# Patient Record
Sex: MALE | Race: WHITE | Employment: OTHER | URBAN - METROPOLITAN AREA
[De-identification: names, ages, dates, MRNs, and addresses within clinical notes are randomized per-mention and may not be internally consistent; named-entity substitution may affect disease eponyms.]

---

## 2017-03-17 ENCOUNTER — OFFICE VISIT (OUTPATIENT)
Dept: OPHTHALMOLOGY | Facility: CLINIC | Age: 77
End: 2017-03-17

## 2017-03-17 DIAGNOSIS — H43.21 ASTEROID HYALITIS OF RIGHT EYE: ICD-10-CM

## 2017-03-17 DIAGNOSIS — H52.223 REGULAR ASTIGMATISM, BILATERAL: ICD-10-CM

## 2017-03-17 DIAGNOSIS — H25.13 NUCLEAR SCLEROTIC CATARACT OF BOTH EYES: Primary | ICD-10-CM

## 2017-03-17 RX ORDER — ESCITALOPRAM OXALATE 10 MG/1
10 TABLET ORAL DAILY
COMMUNITY

## 2017-03-17 ASSESSMENT — REFRACTION_MANIFEST
OD_CYLINDER: +3.75
OS_CYLINDER: +1.50
OS_AXIS: 010
OS_SPHERE: -0.25
OD_AXIS: 040
OD_SPHERE: -1.75

## 2017-03-17 ASSESSMENT — REFRACTION_WEARINGRX
OD_AXIS: 033
OD_SPHERE: -1.50
OS_CYLINDER: +1.25
OD_ADD: +2.50
OS_AXIS: 008
OS_SPHERE: -0.25
OD_CYLINDER: +2.75
SPECS_TYPE: PAL
OS_ADD: +2.50

## 2017-03-17 ASSESSMENT — CUP TO DISC RATIO
OD_RATIO: 0.3
OS_RATIO: 0.25

## 2017-03-17 ASSESSMENT — EXTERNAL EXAM - RIGHT EYE: OD_EXAM: NORMAL

## 2017-03-17 ASSESSMENT — VISUAL ACUITY
OS_CC: 20/20
OD_CC+: -2
CORRECTION_TYPE: GLASSES
OS_CC+: -2
OD_CC: 20/30
METHOD: SNELLEN - LINEAR

## 2017-03-17 ASSESSMENT — TONOMETRY
OD_IOP_MMHG: 16
OS_IOP_MMHG: 14
IOP_METHOD: ICARE

## 2017-03-17 ASSESSMENT — CONF VISUAL FIELD
OS_NORMAL: 1
OD_NORMAL: 1
METHOD: COUNTING FINGERS

## 2017-03-17 NOTE — PROGRESS NOTES
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 or sooner as needed.    Complete documentation of historical and exam elements from today's encounter can  be found in the full encounter summary report (not reduplicated in this progress  note). I personally obtained the chief complaint(s) and history of present illness. I  confirmed and edited as necessary the review of systems, past medical/surgical  history, family history, social history, and examination findings as documented by  others; and I examined the patient myself. I personally reviewed the relevant tests,  images, and reports as documented above. I formulated and edited as necessary the  assessment and plan and discussed the findings and management plan with the  patient and family.    ASHLEY Hamilton M.D

## 2017-03-17 NOTE — MR AVS SNAPSHOT
After Visit Summary   3/17/2017    Ryan Andersen    MRN: [id number]           Patient Information     Date Of Birth          1940        Visit Information        Provider Department      3/17/2017 1:20 PM Remy Hamilton MD [facility]        Today's Diagnoses     Nuclear sclerotic cataract of both eyes - Both Eyes    -  1    Asteroid hyalitis of right eye        Regular astigmatism, bilateral           Follow-ups after your visit        Follow-up notes from your care team     Return in about 1 year (around 3/17/2018) for Complete Eye Exam, Cataract.      Who to contact     Please call your clinic at [phone] to:    Ask questions about your health    Make or cancel appointments    Discuss your medicines    Learn about your test results    Speak to your doctor   If you have compliments or concerns about an experience at your clinic, or if you wish to file a complaint, please contact [facility] Patient Relations at [phone] or email us at [email or web address]         Additional Information About Your Visit        MyChart Information     MergeOptics is an electronic gateway that provides easy, online access to your medical records. With MergeOptics, you can request a clinic appointment, read your test results, renew a prescription or communicate with your care team.     To sign up for MergeOptics visit the website at [email or web address]   You will be asked to enter the access code listed below, as well as some personal information. Please follow the directions to create your username and password.     Your access code is: [email or web address]  Expires: 6/15/2017  2:33 PM     Your access code will  in 90 days. If you need help or a new code, please contact your [facility] or call [phone] for assistance.        Care EveryWhere ID     This is your Care EveryWhere ID. This could be used by  other organizations to access your [place] medical records  [id number]         Blood Pressure from Last 3 Encounters:   11/20/13 148/86    Weight from Last 3 Encounters:   11/20/13 64.9 kg (143 lb)              Today, you had the following     No orders found for display       Primary Care Provider Office Phone # Fax #    Margaret Thorne [phone]       [facility] [address] [facility] [address]        Thank you!     Thank you for choosing [facility]  for your care. Our goal is always to provide you with excellent care. Hearing back from our patients is one way we can continue to improve our services. Please take a few minutes to complete the written survey that you may receive in the mail after your visit with us. Thank you!             Your Updated Medication List - Protect others around you: Learn how to safely use, store and throw away your medicines at www.disposemymeds.org.          This list is accurate as of: 3/17/17  2:34 PM.  Always use your most recent med list.                   Brand Name Dispense Instructions for use    AMBIEN PO      Take 5 mg by mouth       aspirin 81 MG tablet      Take by mouth daily       COLACE PO          escitalopram 10 MG tablet    LEXAPRO     Take 10 mg by mouth daily       FINASTERIDE PO          FOLIC ACID PO      Take 1 mg by mouth daily       PRESERVISION AREDS PO          SIMVASTATIN PO          SINEMET  MG per tablet   Generic drug:  carbidopa-levodopa      Take 1 tablet by mouth 3 times daily       VITAMIN D3 PO      Take by mouth daily

## 2017-03-17 NOTE — NURSING NOTE
Chief Complaints and History of Present Illnesses   Patient presents with     COMPREHENSIVE EYE EXAM     yearly     HPI    Affected eye(s):  Both   Symptoms:     Difficulty with reading      Duration:  1 year   Frequency:  Constant       Do you have eye pain now?:  No      Comments:  Yearly eye exam.  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  Lora Preciado COT 1:41 PM 03/17/2017

## 2018-03-28 ENCOUNTER — OFFICE VISIT (OUTPATIENT)
Dept: OPHTHALMOLOGY | Facility: CLINIC | Age: 78
End: 2018-03-28
Payer: MEDICARE

## 2018-03-28 DIAGNOSIS — H43.21 ASTEROID HYALITIS OF RIGHT EYE: ICD-10-CM

## 2018-03-28 DIAGNOSIS — H25.13 NUCLEAR SCLEROTIC CATARACT OF BOTH EYES: Primary | ICD-10-CM

## 2018-03-28 DIAGNOSIS — H52.223 REGULAR ASTIGMATISM, BILATERAL: ICD-10-CM

## 2018-03-28 RX ORDER — RIVASTIGMINE 4.6 MG/24H
1 PATCH, EXTENDED RELEASE TRANSDERMAL DAILY
COMMUNITY
End: 2018-09-05

## 2018-03-28 ASSESSMENT — TONOMETRY
IOP_METHOD: ICARE
OS_IOP_MMHG: 11
OD_IOP_MMHG: 13

## 2018-03-28 ASSESSMENT — REFRACTION_MANIFEST
OS_SPHERE: -0.50
OS_CYLINDER: +1.50
OD_AXIS: 043
OD_CYLINDER: +4.00
OS_AXIS: 015
OD_SPHERE: -1.75

## 2018-03-28 ASSESSMENT — REFRACTION_WEARINGRX
OD_AXIS: 040
OS_SPHERE: PLANO
SPECS_TYPE: PAL
OS_ADD: +2.50
OD_CYLINDER: +4.00
OS_CYLINDER: +1.25
OS_AXIS: 010
OD_ADD: +2.50
OD_SPHERE: -1.75

## 2018-03-28 ASSESSMENT — VISUAL ACUITY
CORRECTION_TYPE: GLASSES
OD_CC: 20/20
OD_CC+: -1
OS_CC: 20/50
METHOD: SNELLEN - LINEAR

## 2018-03-28 ASSESSMENT — CONF VISUAL FIELD
OS_NORMAL: 1
OD_NORMAL: 1

## 2018-03-28 ASSESSMENT — EXTERNAL EXAM - RIGHT EYE: OD_EXAM: NORMAL

## 2018-03-28 ASSESSMENT — REFRACTION
OS_CYLINDER: +2.25
OS_AXIS: 031
OS_SPHERE: -1.00

## 2018-03-28 ASSESSMENT — CUP TO DISC RATIO
OS_RATIO: 0.25
OD_RATIO: 0.3

## 2018-03-28 ASSESSMENT — SLIT LAMP EXAM - LIDS: COMMENTS: NORMAL

## 2018-03-28 NOTE — NURSING NOTE
Chief Complaints and History of Present Illnesses   Patient presents with     Annual Eye Exam     HPI    Affected eye(s):  Both   Symptoms:     Decreased vision (Comment: maybe per pt)   No floaters   No flashes   No itching   No burning      Duration:  1 year   Frequency:  Constant       Do you have eye pain now?:  No      Comments:  Thinks [facility] changes in all distances.    Lora Preciado COT 1:37 PM 03/28/2018'

## 2018-03-28 NOTE — MR AVS SNAPSHOT
After Visit Summary   3/28/2018    Ryan Andersen    MRN: [id number]           Patient Information     Date Of Birth          1940        Visit Information        Provider Department      3/28/2018 1:20 PM Remy Hamilton MD [facility]        Today's Diagnoses     Nuclear sclerotic cataract of both eyes - Both Eyes    -  1    Asteroid hyalitis of right eye        Regular astigmatism, bilateral           Follow-ups after your visit        Follow-up notes from your care team     Return in about 1 year (around 3/28/2019) for Complete Eye Exam, Cataract.      Who to contact     Please call your clinic at [phone] to:    Ask questions about your health    Make or cancel appointments    Discuss your medicines    Learn about your test results    Speak to your doctor            Additional Information About Your Visit        MyChart Information     Social Intelligence is an electronic gateway that provides easy, online access to your medical records. With Social Intelligence, you can request a clinic appointment, read your test results, renew a prescription or communicate with your care team.     To sign up for TuneStarst visit the website at [email or web address]   You will be asked to enter the access code listed below, as well as some personal information. Please follow the directions to create your username and password.     Your access code is: [email or web address]  Expires: 2018  6:30 AM     Your access code will  in 90 days. If you need help or a new code, please contact your [facility] or call [phone] for assistance.        Care EveryWhere ID     This is your Care EveryWhere ID. This could be used by other organizations to access your [place] medical records  [id number]         Blood Pressure from Last 3 Encounters:   13 148/86    Weight from Last 3 Encounters:   13 64.9 kg (143 lb)              Today, you had the following      No orders found for display         Today's Medication Changes          These changes are accurate as of 3/28/18 11:59 PM.  If you have any questions, ask your nurse or doctor.               Stop taking these medicines if you haven't already. Please contact your care team if you have questions.     aspirin 81 MG tablet   Stopped by:  Remy Hamilton MD                    Primary Care Provider Office Phone # Fax Maria Alejandra Thorne [phone]       [address] [place] [facility] [address]        Equal Access to Services     [facility]: Hadii aad ku hadasho Soomaali, waaxda luqadaha, qaybta kaalmada adeegyada, waxay gwyn hayramosn oswaldo saini . So [facility] [phone].    ATENCIÓN: Si nicolle español, tiene a mak disposición servicios gratuitos de asistencia lingüística. LlK[facility]tering [facility] [phone].    We comply with applicable federal civil rights laws and [place] laws. We do not discriminate on the basis of race, color, national origin, age, disability, sex, sexual orientation, or gender identity.            Thank you!     Thank you for choosing [facility]  for your care. Our goal is always to provide you with excellent care. Hearing back from our patients is one way we can continue to improve our services. Please take a few minutes to complete the written survey that you may receive in the mail after your visit with us. Thank you!             Your Updated Medication List - Protect others around you: Learn how to safely use, store and throw away your medicines at www.disposemymeds.org.          This list is accurate as of 3/28/18 11:59 PM.  Always use your most recent med list.                   Brand Name Dispense Instructions for use Diagnosis    AMBIEN PO      Take 5 mg by mouth        COLACE PO           escitalopram 10 MG tablet    LEXAPRO     Take 10 mg by mouth daily        FINASTERIDE PO           FOLIC ACID PO      Take 1 mg by mouth daily         PRESERVISION AREDS PO           rivastigmine 4.6 MG/24HR 24 hr patch    EXELON     Place 1 patch onto the skin daily        SIMVASTATIN PO           SINEMET  MG per tablet   Generic drug:  carbidopa-levodopa      Take 1 tablet by mouth 3 times daily        VITAMIN D3 PO      Take by mouth daily

## 2018-04-09 ASSESSMENT — EXTERNAL EXAM - LEFT EYE: OS_EXAM: NORMAL

## 2018-04-09 NOTE — PROGRESS NOTES
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 or sooner as needed.    Complete documentation of historical and exam elements from today's encounter can  be found in the full encounter summary report (not reduplicated in this progress  note). I personally obtained the chief complaint(s) and history of present illness. I  confirmed and edited as necessary the review of systems, past medical/surgical  history, family history, social history, and examination findings as documented by  others; and I examined the patient myself. I personally reviewed the relevant tests,  images, and reports as documented above. I formulated and edited as necessary the  assessment and plan and discussed the findings and management plan with the  patient and family.    ASHLEY Hamilton M.D

## 2018-09-05 ENCOUNTER — OFFICE VISIT (OUTPATIENT)
Dept: OPHTHALMOLOGY | Facility: CLINIC | Age: 78
End: 2018-09-05
Payer: MEDICARE

## 2018-09-05 DIAGNOSIS — H25.13 NUCLEAR SCLEROTIC CATARACT OF BOTH EYES: ICD-10-CM

## 2018-09-05 DIAGNOSIS — H04.123 DRY EYES, BILATERAL: ICD-10-CM

## 2018-09-05 DIAGNOSIS — H43.21 ASTEROID HYALOSIS OF RIGHT EYE: Primary | ICD-10-CM

## 2018-09-05 RX ORDER — MAGNESIUM OXIDE 400 MG/1
400 TABLET ORAL DAILY
COMMUNITY
End: 2019-04-03

## 2018-09-05 RX ORDER — DONEPEZIL HYDROCHLORIDE 5 MG/1
5 TABLET, FILM COATED ORAL AT BEDTIME
COMMUNITY
End: 2019-04-03

## 2018-09-05 ASSESSMENT — SLIT LAMP EXAM - LIDS
COMMENTS: NORMAL
COMMENTS: NORMAL

## 2018-09-05 ASSESSMENT — TONOMETRY
OD_IOP_MMHG: 13
IOP_METHOD: TONOPEN
OS_IOP_MMHG: 10

## 2018-09-05 ASSESSMENT — EXTERNAL EXAM - LEFT EYE: OS_EXAM: NORMAL

## 2018-09-05 ASSESSMENT — VISUAL ACUITY
OD_CC+: -2
METHOD: SNELLEN - LINEAR
OD_CC: 20/20
CORRECTION_TYPE: GLASSES
OS_CC+: -1
OS_CC: 20/20

## 2018-09-05 ASSESSMENT — CUP TO DISC RATIO
OD_RATIO: 0.3
OS_RATIO: 0.25

## 2018-09-05 ASSESSMENT — CONF VISUAL FIELD
OS_NORMAL: 1
OD_NORMAL: 1
METHOD: COUNTING FINGERS

## 2018-09-05 ASSESSMENT — EXTERNAL EXAM - RIGHT EYE: OD_EXAM: NORMAL

## 2018-09-05 NOTE — NURSING NOTE
Chief Complaints and History of Present Illnesses   Patient presents with     Floaters Right Eye     HPI    Affected eye(s):  Right   Symptoms:     Floaters (Comment: single thread like floater becoming more noticable over the last 3 months, but feels could have been present at last visit)   No flashes   Burning (Comment: intermittent)      Duration:  3 months   Frequency:  Intermittent       Do you have eye pain now?:  No      Comments:  Lora AMADOR 3:18 PM 09/05/2018

## 2018-09-05 NOTE — PROGRESS NOTES
HPI  Ryan Andersen is a 78 year old male here for new floater right eye.  Has seen ever few days right eye for the last 3-6 months.  No flashes.  No trauma.      PMH:  Parkinsons x 4 yrs  POH:  Glasses, cataracts, age related macular degeneration, no surgery, no trauma  Oc Meds:  Takes preservision. Uses tear drops (Refresh) two times per day or less.  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  (primary encounter diagnosis)  Comment: moderate, likely cause of floaters, no posterior vitreous detachment, no retinal tears   Plan: discussed with patient signs and symptoms of posterior vitreous detachment or retinal tear and to call if things worsen or change    (H25.13) Nuclear sclerotic cataract of both eyes - Both Eyes  Comment: mild not visually significant   Plan: follow     (H04.123) Dry eyes, bilateral - Both Eyes  Comment: No corneal signs but does have reduced blink (PD), hasn't increased artificial tear drop use   Plan:  Increase artificial tear drops as needed symptoms, likely needs full time though looking better today  -----------------------------------------------------------------------------------    Patient disposition:   Return in about 1 year (around 9/5/2019) for Comprehensive Exam.      Complete documentation of historical and exam elements from today's encounter can be found in the full encounter summary report (not reduplicated in this progress note). I personally obtained the chief complaint(s) and history of present illness.  I have confirmed and edited as necessary the CC, HPI, PMH/PSH, social history, FMH, ROS, and exam/neuro findings as obtained by the technician or others. I have examined this patient myself and I personally viewed the image(s) and studies listed above and the documentation reflects my findings and interpretation.  I formulated and edited as necessary the assessment and plan and discussed the findings and management plan  with the patient and family.     Belen Calle MD

## 2018-09-05 NOTE — MR AVS SNAPSHOT
After Visit Summary   2018    Ryan Andersen    MRN: [id number]           Patient Information     Date Of Birth          1940        Visit Information        Provider Department      2018 3:20 PM Belen Calle MD [facility]        Today's Diagnoses     Asteroid hyalosis of right eye - Right Eye    -  1    Nuclear sclerotic cataract of both eyes - Both Eyes        Dry eyes, bilateral - Both Eyes           Follow-ups after your visit        Follow-up notes from your care team     Return in about 1 year (around 2019) for Comprehensive Exam.      Who to contact     Please call your clinic at [phone] to:    Ask questions about your health    Make or cancel appointments    Discuss your medicines    Learn about your test results    Speak to your doctor            Additional Information About Your Visit        MyChart Information     Operative Mind is an electronic gateway that provides easy, online access to your medical records. With Operative Mind, you can request a clinic appointment, read your test results, renew a prescription or communicate with your care team.     To sign up for SomethingIndiet visit the website at [email or web address]   You will be asked to enter the access code listed below, as well as some personal information. Please follow the directions to create your username and password.     Your access code is: [email or web address]  Expires: 2018  6:31 AM     Your access code will  in 90 days. If you need help or a new code, please contact your [facility] or call [phone] for assistance.        Care EveryWhere ID     This is your Care EveryWhere ID. This could be used by other organizations to access your [place] medical records  [id number]         Blood Pressure from Last 3 Encounters:   13 148/86    Weight from Last 3 Encounters:   13 64.9 kg (143 lb)              Today, you had the  following     No orders found for display         Today's Medication Changes          These changes are accurate as of 9/5/18  4:01 PM.  If you have any questions, ask your nurse or doctor.               Stop taking these medicines if you haven't already. Please contact your care team if you have questions.     rivastigmine 4.6 MG/24HR 24 hr patch   Commonly known as:  EXELON   Stopped by:  Belen Calle MD                    Primary Care Provider Office Phone # Fax Maria Alejandra Thorne [phone]       [address] [facility] [address]        Equal Access to Services     [facility]: Hadii aad ku hadasho Soomaali, waaxda luqadaha, qaybta kaalmada adeegyada, asa greene adeleslie saini . So [facility] [phone].    ATENCIÓN: Si habla español, tiene a mak disposición servicios gratuitos de asistencia lingüística. [facility] [phone].    We comply with applicable federal civil rights laws and [place] laws. We do not discriminate on the basis of race, color, national origin, age, disability, sex, sexual orientation, or gender identity.            Thank you!     Thank you for choosing [facility]  for your care. Our goal is always to provide you with excellent care. Hearing back from our patients is one way we can continue to improve our services. Please take a few minutes to complete the written survey that you may receive in the mail after your visit with us. Thank you!             Your Updated Medication List - Protect others around you: Learn how to safely use, store and throw away your medicines at www.disposemymeds.org.          This list is accurate as of 9/5/18  4:01 PM.  Always use your most recent med list.                   Brand Name Dispense Instructions for use Diagnosis    AMBIEN PO      Take 5 mg by mouth        COLACE PO           donepezil 5 MG tablet    ARIcept     Take 5 mg by mouth At Bedtime         escitalopram 10 MG tablet    LEXAPRO     Take 10 mg by mouth daily        FINASTERIDE PO           FOLIC ACID PO      Take 1 mg by mouth daily        magnesium oxide 400 MG tablet    MAG-OX     Take 400 mg by mouth daily        PRESERVISION AREDS PO           SIMVASTATIN PO           SINEMET  MG per tablet   Generic drug:  carbidopa-levodopa      Take 1 tablet by mouth 3 times daily        VITAMIN D3 PO      Take by mouth daily

## 2019-04-03 ENCOUNTER — OFFICE VISIT (OUTPATIENT)
Dept: OPHTHALMOLOGY | Facility: CLINIC | Age: 79
End: 2019-04-03
Payer: MEDICARE

## 2019-04-03 DIAGNOSIS — H04.123 DRY EYES, BILATERAL: ICD-10-CM

## 2019-04-03 DIAGNOSIS — H25.13 NUCLEAR SCLEROTIC CATARACT OF BOTH EYES: Primary | ICD-10-CM

## 2019-04-03 DIAGNOSIS — H52.223 REGULAR ASTIGMATISM, BILATERAL: ICD-10-CM

## 2019-04-03 DIAGNOSIS — H43.21 ASTEROID HYALOSIS OF RIGHT EYE: ICD-10-CM

## 2019-04-03 RX ORDER — MEMANTINE HYDROCHLORIDE 10 MG/1
10 TABLET ORAL
COMMUNITY
Start: 2019-02-22

## 2019-04-03 RX ORDER — AMOXICILLIN 250 MG
CAPSULE ORAL
COMMUNITY
Start: 2019-04-01

## 2019-04-03 RX ORDER — RIVASTIGMINE TARTRATE 1.5 MG/1
1.5 CAPSULE ORAL
COMMUNITY
Start: 2019-03-25

## 2019-04-03 ASSESSMENT — REFRACTION_WEARINGRX
OS_SPHERE: PLANO
OS_CYLINDER: +1.25
OD_SPHERE: -1.75
OS_AXIS: 010
OD_ADD: +2.50
OD_AXIS: 040
OD_CYLINDER: +4.00
OS_ADD: +2.50
SPECS_TYPE: PAL

## 2019-04-03 ASSESSMENT — EXTERNAL EXAM - LEFT EYE: OS_EXAM: NORMAL

## 2019-04-03 ASSESSMENT — REFRACTION
OS_CYLINDER: +1.75
OD_AXIS: 040
OD_SPHERE: -2.00
OS_SPHERE: -1.50
OD_CYLINDER: +4.00
OS_AXIS: 030

## 2019-04-03 ASSESSMENT — TONOMETRY
IOP_METHOD: TONOPEN
OS_IOP_MMHG: 15
OD_IOP_MMHG: 16

## 2019-04-03 ASSESSMENT — REFRACTION_MANIFEST
OD_AXIS: 040
OS_CYLINDER: +2.25
OS_ADD: +2.50
OD_SPHERE: -2.50
OS_AXIS: 030
OD_ADD: +2.50
OS_SPHERE: -1.00
OD_CYLINDER: +4.00

## 2019-04-03 ASSESSMENT — VISUAL ACUITY
OD_CC: 20/30-
METHOD: SNELLEN - LINEAR
OS_CC: 20/30-2/+
METHOD_MR_RETINOSCOPY: 1
CORRECTION_TYPE: GLASSES

## 2019-04-03 ASSESSMENT — SLIT LAMP EXAM - LIDS
COMMENTS: NORMAL
COMMENTS: NORMAL

## 2019-04-03 ASSESSMENT — EXTERNAL EXAM - RIGHT EYE: OD_EXAM: NORMAL

## 2019-04-03 ASSESSMENT — CUP TO DISC RATIO
OS_RATIO: 0.25
OD_RATIO: 0.3

## 2019-04-03 NOTE — PROGRESS NOTES
HPI  Ryan Andersen is a 78 year old male here for comprehensive eye exam.  Progression in Parkinson's dementia since last visit.  Difficulty with manifest refraction.  Denies eye pain.  Meeting most visual demands with current glasses.  No new floaters.      PMH:  Parkinsons x 5 yrs  POH:  Glasses for myopic astigmatism both eyes, cataracts, asteroid right eye, no surgery, no trauma  Oc Meds:  Takes preservision. Uses tear drops (Refresh) two times per day or less.  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  Difficulty with subjective manifest refraction (due to cognition), CRet shows more minus  Plan: manifest refraction done and prescription for glasses given to fill as needed, does seem to be meeting needs with current glasses     -----------------------------------------------------------------------------------    Patient disposition:   Return in about 1 year (around 4/3/2020) for Comprehensive Exam.      Complete documentation of historical and exam elements from today's encounter can be found in the full encounter summary report (not reduplicated in this progress note). I personally obtained the chief complaint(s) and history of present illness.  I have confirmed and edited as necessary the CC, HPI, PMH/PSH, social history, FMH, ROS, and exam/neuro findings as obtained by the technician or others. I have examined this patient myself and I personally viewed the image(s) and studies listed above and the  documentation reflects my findings and interpretation.  I formulated and edited as necessary the assessment and plan and discussed the findings and management plan with the patient and family.     Belen Calle MD